# Patient Record
Sex: FEMALE | Race: WHITE
[De-identification: names, ages, dates, MRNs, and addresses within clinical notes are randomized per-mention and may not be internally consistent; named-entity substitution may affect disease eponyms.]

---

## 2020-09-17 ENCOUNTER — HOSPITAL ENCOUNTER (OUTPATIENT)
Dept: HOSPITAL 95 - LAB SHORT | Age: 60
End: 2020-09-17
Attending: NURSE PRACTITIONER
Payer: COMMERCIAL

## 2020-09-17 DIAGNOSIS — F10.10: ICD-10-CM

## 2020-09-17 DIAGNOSIS — R10.11: Primary | ICD-10-CM

## 2020-09-17 LAB
ALBUMIN SERPL BCP-MCNC: 4.1 G/DL (ref 3.4–5)
ALBUMIN/GLOB SERPL: 1 {RATIO} (ref 0.8–1.8)
ALT SERPL W P-5'-P-CCNC: 27 U/L (ref 12–78)
AST SERPL W P-5'-P-CCNC: 24 U/L (ref 12–37)
BASOPHILS # BLD AUTO: 0.06 K/MM3 (ref 0–0.23)
BASOPHILS NFR BLD AUTO: 1 % (ref 0–2)
BILIRUB DIRECT SERPL-MCNC: 0.1 MG/DL (ref 0–0.3)
BILIRUB INDIRECT SERPL-MCNC: 0.2 MG/DL (ref 0.1–0.7)
BILIRUB SERPL-MCNC: 0.3 MG/DL (ref 0.1–1)
DEPRECATED RDW RBC AUTO: 50.5 FL (ref 35.1–46.3)
EOSINOPHIL # BLD AUTO: 0.33 K/MM3 (ref 0–0.68)
EOSINOPHIL NFR BLD AUTO: 3 % (ref 0–6)
ERYTHROCYTE [DISTWIDTH] IN BLOOD BY AUTOMATED COUNT: 13.4 % (ref 11.7–14.2)
GLOBULIN SER CALC-MCNC: 4.2 G/DL (ref 2.2–4)
HCT VFR BLD AUTO: 44.1 % (ref 33–51)
HGB BLD-MCNC: 14.1 G/DL (ref 11.5–16)
IMM GRANULOCYTES # BLD AUTO: 0.1 K/MM3 (ref 0–0.1)
IMM GRANULOCYTES NFR BLD AUTO: 1 % (ref 0–1)
LYMPHOCYTES # BLD AUTO: 1.81 K/MM3 (ref 0.84–5.2)
LYMPHOCYTES NFR BLD AUTO: 17 % (ref 21–46)
MCHC RBC AUTO-ENTMCNC: 32 G/DL (ref 31.5–36.5)
MCV RBC AUTO: 101 FL (ref 80–100)
MONOCYTES # BLD AUTO: 0.51 K/MM3 (ref 0.16–1.47)
MONOCYTES NFR BLD AUTO: 5 % (ref 4–13)
NEUTROPHILS # BLD AUTO: 7.81 K/MM3 (ref 1.96–9.15)
NEUTROPHILS NFR BLD AUTO: 74 % (ref 41–73)
NRBC # BLD AUTO: 0 K/MM3 (ref 0–0.02)
NRBC BLD AUTO-RTO: 0 /100 WBC (ref 0–0.2)
PLATELET # BLD AUTO: 248 K/MM3 (ref 150–400)
PROT SERPL-MCNC: 8.3 G/DL (ref 6.4–8.2)

## 2020-09-18 LAB — HEP C VIRUS AB: >11 (ref 0–0.9)

## 2023-08-09 ENCOUNTER — HOSPITAL ENCOUNTER (OUTPATIENT)
Dept: HOSPITAL 95 - ORSCSDS | Age: 63
Discharge: HOME | End: 2023-08-09
Attending: STUDENT IN AN ORGANIZED HEALTH CARE EDUCATION/TRAINING PROGRAM
Payer: COMMERCIAL

## 2023-08-09 VITALS — DIASTOLIC BLOOD PRESSURE: 81 MMHG | SYSTOLIC BLOOD PRESSURE: 167 MMHG

## 2023-08-09 VITALS — HEIGHT: 62 IN | WEIGHT: 141.76 LBS | BODY MASS INDEX: 26.09 KG/M2

## 2023-08-09 DIAGNOSIS — E78.00: ICD-10-CM

## 2023-08-09 DIAGNOSIS — H25.13: Primary | ICD-10-CM

## 2023-08-09 DIAGNOSIS — F17.210: ICD-10-CM

## 2023-08-09 DIAGNOSIS — J44.9: ICD-10-CM

## 2023-08-09 DIAGNOSIS — I10: ICD-10-CM

## 2023-08-09 DIAGNOSIS — Z79.899: ICD-10-CM

## 2023-08-09 PROCEDURE — V2632 POST CHMBR INTRAOCULAR LENS: HCPCS

## 2023-08-09 PROCEDURE — 08RJ3JZ REPLACEMENT OF RIGHT LENS WITH SYNTHETIC SUBSTITUTE, PERCUTANEOUS APPROACH: ICD-10-PCS | Performed by: STUDENT IN AN ORGANIZED HEALTH CARE EDUCATION/TRAINING PROGRAM

## 2023-08-09 NOTE — NUR
08/09/23 0856 Lisa Luz AT 0849 PLEDGET AT 0862
08/09/23 1138 REMEDIOS JOSUE
PT WAS TOLD BY DR PINTO THAT ACHEY EYE IS NORMAL FOR WHAT WAS
DONE. INSTRUCTED TO TAKE HER USUAL PAIN MEDICIEN AS
PRESCRIBED/DIRECTED ON LABEL.
Negative

## 2023-08-30 ENCOUNTER — HOSPITAL ENCOUNTER (OUTPATIENT)
Dept: HOSPITAL 95 - ORSCSDS | Age: 63
Discharge: HOME | End: 2023-08-30
Attending: STUDENT IN AN ORGANIZED HEALTH CARE EDUCATION/TRAINING PROGRAM
Payer: COMMERCIAL

## 2023-08-30 VITALS — HEIGHT: 62 IN | BODY MASS INDEX: 26.45 KG/M2 | WEIGHT: 143.74 LBS

## 2023-08-30 VITALS — DIASTOLIC BLOOD PRESSURE: 70 MMHG | SYSTOLIC BLOOD PRESSURE: 148 MMHG

## 2023-08-30 DIAGNOSIS — J44.9: ICD-10-CM

## 2023-08-30 DIAGNOSIS — I10: ICD-10-CM

## 2023-08-30 DIAGNOSIS — H25.12: ICD-10-CM

## 2023-08-30 DIAGNOSIS — R06.02: ICD-10-CM

## 2023-08-30 DIAGNOSIS — F17.210: ICD-10-CM

## 2023-08-30 DIAGNOSIS — E11.36: Primary | ICD-10-CM

## 2023-08-30 DIAGNOSIS — Z96.1: ICD-10-CM

## 2023-08-30 DIAGNOSIS — Z79.899: ICD-10-CM

## 2023-08-30 PROCEDURE — 08RK3JZ REPLACEMENT OF LEFT LENS WITH SYNTHETIC SUBSTITUTE, PERCUTANEOUS APPROACH: ICD-10-PCS | Performed by: STUDENT IN AN ORGANIZED HEALTH CARE EDUCATION/TRAINING PROGRAM

## 2023-08-30 PROCEDURE — V2632 POST CHMBR INTRAOCULAR LENS: HCPCS

## 2023-08-30 NOTE — NUR
08/30/23 0739 Kitty Bishop
TETRACAINE TO LEFT EYE AT 0736
PLEDGET TO LEFT EYE AT 0737
 
BY [facility].ОЛЬГА